# Patient Record
Sex: FEMALE | Race: OTHER
[De-identification: names, ages, dates, MRNs, and addresses within clinical notes are randomized per-mention and may not be internally consistent; named-entity substitution may affect disease eponyms.]

---

## 2019-03-31 ENCOUNTER — HOSPITAL ENCOUNTER (EMERGENCY)
Dept: HOSPITAL 15 - ER | Age: 34
Discharge: HOME | End: 2019-03-31
Payer: MEDICAID

## 2019-03-31 VITALS — SYSTOLIC BLOOD PRESSURE: 101 MMHG | DIASTOLIC BLOOD PRESSURE: 75 MMHG

## 2019-03-31 DIAGNOSIS — O23.42: ICD-10-CM

## 2019-03-31 DIAGNOSIS — Z3A.18: ICD-10-CM

## 2019-03-31 DIAGNOSIS — O26.812: Primary | ICD-10-CM

## 2019-03-31 DIAGNOSIS — O99.012: ICD-10-CM

## 2019-03-31 DIAGNOSIS — F12.10: ICD-10-CM

## 2019-03-31 DIAGNOSIS — I95.9: ICD-10-CM

## 2019-03-31 LAB
ALBUMIN SERPL-MCNC: 3.1 G/DL (ref 3.4–5)
ALCOHOL, URINE: < 3 MG/DL (ref 0–5)
ALP SERPL-CCNC: 38 U/L (ref 45–117)
ALT SERPL-CCNC: 14 U/L (ref 13–56)
AMPHETAMINES UR QL SCN: NEGATIVE
ANION GAP SERPL CALCULATED.3IONS-SCNC: 8 MMOL/L (ref 5–15)
APTT PPP: 25.4 SEC (ref 23.78–33.04)
BARBITURATES UR QL SCN: NEGATIVE
BENZODIAZ UR QL SCN: NEGATIVE
BILIRUB SERPL-MCNC: 0.2 MG/DL (ref 0.2–1)
BUN SERPL-MCNC: 5 MG/DL (ref 7–18)
BUN/CREAT SERPL: 6.8
BZE UR QL SCN: NEGATIVE
CALCIUM SERPL-MCNC: 8.7 MG/DL (ref 8.5–10.1)
CANNABINOIDS UR QL SCN: POSITIVE
CHLORIDE SERPL-SCNC: 107 MMOL/L (ref 98–107)
CO2 SERPL-SCNC: 21 MMOL/L (ref 21–32)
GLUCOSE SERPL-MCNC: 127 MG/DL (ref 74–106)
HCT VFR BLD AUTO: 35.5 % (ref 36–46)
HGB BLD-MCNC: 11.6 G/DL (ref 12.2–16.2)
INR PPP: 0.92 (ref 0.9–1.15)
MAGNESIUM SERPL-MCNC: 1.8 MG/DL (ref 1.6–2.6)
MCH RBC QN AUTO: 29.8 PG (ref 28–32)
MCV RBC AUTO: 90.8 FL (ref 80–100)
NRBC BLD QL AUTO: 0 %
OPIATES UR QL SCN: NEGATIVE
PCP UR QL SCN: NEGATIVE
POTASSIUM SERPL-SCNC: 3.6 MMOL/L (ref 3.5–5.1)
PROT SERPL-MCNC: 6.6 G/DL (ref 6.4–8.2)
PROTHROMBIN TIME: 9.9 SEC (ref 9.27–12.13)
SODIUM SERPL-SCNC: 136 MMOL/L (ref 136–145)

## 2019-03-31 PROCEDURE — 83735 ASSAY OF MAGNESIUM: CPT

## 2019-03-31 PROCEDURE — 96365 THER/PROPH/DIAG IV INF INIT: CPT

## 2019-03-31 PROCEDURE — 96361 HYDRATE IV INFUSION ADD-ON: CPT

## 2019-03-31 PROCEDURE — 80053 COMPREHEN METABOLIC PANEL: CPT

## 2019-03-31 PROCEDURE — 82962 GLUCOSE BLOOD TEST: CPT

## 2019-03-31 PROCEDURE — 81001 URINALYSIS AUTO W/SCOPE: CPT

## 2019-03-31 PROCEDURE — 36415 COLL VENOUS BLD VENIPUNCTURE: CPT

## 2019-03-31 PROCEDURE — 85730 THROMBOPLASTIN TIME PARTIAL: CPT

## 2019-03-31 PROCEDURE — 80307 DRUG TEST PRSMV CHEM ANLYZR: CPT

## 2019-03-31 PROCEDURE — 99284 EMERGENCY DEPT VISIT MOD MDM: CPT

## 2019-03-31 PROCEDURE — 76805 OB US >/= 14 WKS SNGL FETUS: CPT

## 2019-03-31 PROCEDURE — 94761 N-INVAS EAR/PLS OXIMETRY MLT: CPT

## 2019-03-31 PROCEDURE — 85610 PROTHROMBIN TIME: CPT

## 2019-03-31 PROCEDURE — 85025 COMPLETE CBC W/AUTO DIFF WBC: CPT

## 2019-08-16 ENCOUNTER — HOSPITAL ENCOUNTER (OUTPATIENT)
Dept: HOSPITAL 15 - LDRP | Age: 34
Setting detail: OBSERVATION
LOS: 1 days | Discharge: HOME | DRG: 566 | End: 2019-08-17
Attending: OBSTETRICS & GYNECOLOGY | Admitting: OBSTETRICS & GYNECOLOGY
Payer: MEDICAID

## 2019-08-16 VITALS — BODY MASS INDEX: 42.14 KG/M2 | WEIGHT: 229 LBS | HEIGHT: 62 IN

## 2019-08-16 DIAGNOSIS — Z3A.37: ICD-10-CM

## 2019-08-16 PROCEDURE — 59025 FETAL NON-STRESS TEST: CPT

## 2019-08-16 PROCEDURE — 96372 THER/PROPH/DIAG INJ SC/IM: CPT

## 2019-08-16 PROCEDURE — 81002 URINALYSIS NONAUTO W/O SCOPE: CPT

## 2019-08-16 RX ADMIN — TERBUTALINE SULFATE SCH MG: 1 INJECTION, SOLUTION SUBCUTANEOUS at 23:48

## 2019-08-17 RX ADMIN — TERBUTALINE SULFATE SCH MG: 1 INJECTION, SOLUTION SUBCUTANEOUS at 00:20

## 2020-01-03 ENCOUNTER — HOSPITAL ENCOUNTER (EMERGENCY)
Dept: HOSPITAL 15 - ER | Age: 35
Discharge: HOME | End: 2020-01-03
Payer: MEDICAID

## 2020-01-03 VITALS — BODY MASS INDEX: 40.48 KG/M2 | WEIGHT: 220 LBS | HEIGHT: 62 IN

## 2020-01-03 VITALS — DIASTOLIC BLOOD PRESSURE: 65 MMHG | SYSTOLIC BLOOD PRESSURE: 105 MMHG
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
## 2020-01-03 DIAGNOSIS — S39.012A: Primary | ICD-10-CM
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
## 2020-01-03 PROCEDURE — 99283 EMERGENCY DEPT VISIT LOW MDM: CPT

## 2020-01-03 PROCEDURE — 96372 THER/PROPH/DIAG INJ SC/IM: CPT

## 2020-05-12 ENCOUNTER — HOSPITAL ENCOUNTER (INPATIENT)
Dept: HOSPITAL 15 - ER | Age: 35
LOS: 5 days | Discharge: HOME | DRG: 720 | End: 2020-05-17
Attending: INTERNAL MEDICINE | Admitting: NURSE PRACTITIONER
Payer: MEDICAID

## 2020-05-12 VITALS — HEIGHT: 62 IN | WEIGHT: 218.26 LBS | BODY MASS INDEX: 40.16 KG/M2

## 2020-05-12 DIAGNOSIS — N20.0: ICD-10-CM

## 2020-05-12 DIAGNOSIS — Z83.3: ICD-10-CM

## 2020-05-12 DIAGNOSIS — R56.9: ICD-10-CM

## 2020-05-12 DIAGNOSIS — A41.9: Primary | ICD-10-CM

## 2020-05-12 DIAGNOSIS — E66.9: ICD-10-CM

## 2020-05-12 DIAGNOSIS — B96.20: ICD-10-CM

## 2020-05-12 DIAGNOSIS — N13.9: ICD-10-CM

## 2020-05-12 DIAGNOSIS — Z11.59: ICD-10-CM

## 2020-05-12 DIAGNOSIS — Z82.49: ICD-10-CM

## 2020-05-12 DIAGNOSIS — Z81.8: ICD-10-CM

## 2020-05-12 DIAGNOSIS — N39.0: ICD-10-CM

## 2020-05-12 LAB
ALBUMIN SERPL-MCNC: 3.9 G/DL (ref 3.4–5)
ALP SERPL-CCNC: 60 U/L (ref 45–117)
ALT SERPL-CCNC: 29 U/L (ref 13–56)
ANION GAP SERPL CALCULATED.3IONS-SCNC: 8 MMOL/L (ref 5–15)
BILIRUB SERPL-MCNC: 0.6 MG/DL (ref 0.2–1)
BUN SERPL-MCNC: 10 MG/DL (ref 7–18)
BUN/CREAT SERPL: 15.9
CALCIUM SERPL-MCNC: 9.2 MG/DL (ref 8.5–10.1)
CHLORIDE SERPL-SCNC: 102 MMOL/L (ref 98–107)
CO2 SERPL-SCNC: 23 MMOL/L (ref 21–32)
GLUCOSE SERPL-MCNC: 106 MG/DL (ref 74–106)
HCT VFR BLD AUTO: 42 % (ref 36–46)
HGB BLD-MCNC: 13.9 G/DL (ref 12.2–16.2)
LIPASE SERPL-CCNC: 83 U/L (ref 73–393)
MCH RBC QN AUTO: 29.3 PG (ref 28–32)
MCV RBC AUTO: 88.2 FL (ref 80–100)
NRBC BLD QL AUTO: 0 %
POTASSIUM SERPL-SCNC: 3.9 MMOL/L (ref 3.5–5.1)
PROT SERPL-MCNC: 8.2 G/DL (ref 6.4–8.2)
SODIUM SERPL-SCNC: 133 MMOL/L (ref 136–145)

## 2020-05-12 PROCEDURE — 81001 URINALYSIS AUTO W/SCOPE: CPT

## 2020-05-12 PROCEDURE — 86900 BLOOD TYPING SEROLOGIC ABO: CPT

## 2020-05-12 PROCEDURE — 87086 URINE CULTURE/COLONY COUNT: CPT

## 2020-05-12 PROCEDURE — 96365 THER/PROPH/DIAG IV INF INIT: CPT

## 2020-05-12 PROCEDURE — 87040 BLOOD CULTURE FOR BACTERIA: CPT

## 2020-05-12 PROCEDURE — 80053 COMPREHEN METABOLIC PANEL: CPT

## 2020-05-12 PROCEDURE — 86850 RBC ANTIBODY SCREEN: CPT

## 2020-05-12 PROCEDURE — 86901 BLOOD TYPING SEROLOGIC RH(D): CPT

## 2020-05-12 PROCEDURE — 85730 THROMBOPLASTIN TIME PARTIAL: CPT

## 2020-05-12 PROCEDURE — 87088 URINE BACTERIA CULTURE: CPT

## 2020-05-12 PROCEDURE — 83690 ASSAY OF LIPASE: CPT

## 2020-05-12 PROCEDURE — 36415 COLL VENOUS BLD VENIPUNCTURE: CPT

## 2020-05-12 PROCEDURE — 85025 COMPLETE CBC W/AUTO DIFF WBC: CPT

## 2020-05-12 PROCEDURE — 81025 URINE PREGNANCY TEST: CPT

## 2020-05-12 PROCEDURE — 80048 BASIC METABOLIC PNL TOTAL CA: CPT

## 2020-05-12 PROCEDURE — 87186 SC STD MICRODIL/AGAR DIL: CPT

## 2020-05-12 PROCEDURE — 85610 PROTHROMBIN TIME: CPT

## 2020-05-12 PROCEDURE — 74176 CT ABD & PELVIS W/O CONTRAST: CPT

## 2020-05-12 PROCEDURE — 74018 RADEX ABDOMEN 1 VIEW: CPT

## 2020-05-12 PROCEDURE — 83735 ASSAY OF MAGNESIUM: CPT

## 2020-05-12 PROCEDURE — 71045 X-RAY EXAM CHEST 1 VIEW: CPT

## 2020-05-12 PROCEDURE — 96375 TX/PRO/DX INJ NEW DRUG ADDON: CPT

## 2020-05-13 VITALS — DIASTOLIC BLOOD PRESSURE: 68 MMHG | SYSTOLIC BLOOD PRESSURE: 108 MMHG

## 2020-05-13 VITALS — DIASTOLIC BLOOD PRESSURE: 72 MMHG | SYSTOLIC BLOOD PRESSURE: 129 MMHG

## 2020-05-13 VITALS — SYSTOLIC BLOOD PRESSURE: 97 MMHG | DIASTOLIC BLOOD PRESSURE: 61 MMHG

## 2020-05-13 VITALS — DIASTOLIC BLOOD PRESSURE: 56 MMHG | SYSTOLIC BLOOD PRESSURE: 102 MMHG

## 2020-05-13 VITALS — SYSTOLIC BLOOD PRESSURE: 102 MMHG | DIASTOLIC BLOOD PRESSURE: 56 MMHG

## 2020-05-13 VITALS — DIASTOLIC BLOOD PRESSURE: 63 MMHG | SYSTOLIC BLOOD PRESSURE: 114 MMHG

## 2020-05-13 VITALS — DIASTOLIC BLOOD PRESSURE: 65 MMHG | SYSTOLIC BLOOD PRESSURE: 106 MMHG

## 2020-05-13 RX ADMIN — CEFTRIAXONE SODIUM SCH MLS/HR: 1 INJECTION, POWDER, FOR SOLUTION INTRAMUSCULAR; INTRAVENOUS at 09:30

## 2020-05-13 RX ADMIN — ACETAMINOPHEN PRN MG: 325 TABLET ORAL at 16:37

## 2020-05-13 RX ADMIN — SODIUM CHLORIDE SCH MLS/HR: 0.9 INJECTION, SOLUTION INTRAVENOUS at 14:05

## 2020-05-13 RX ADMIN — SODIUM CHLORIDE SCH MLS/HR: 0.9 INJECTION, SOLUTION INTRAVENOUS at 00:45

## 2020-05-13 RX ADMIN — HYDROCODONE BITARTRATE AND ACETAMINOPHEN PRN TAB: 5; 325 TABLET ORAL at 11:11

## 2020-05-13 RX ADMIN — FOLIC ACID SCH MG: 1 TABLET ORAL at 09:31

## 2020-05-13 RX ADMIN — FAMOTIDINE SCH MG: 20 TABLET, FILM COATED ORAL at 09:30

## 2020-05-13 RX ADMIN — ACETAMINOPHEN PRN MG: 325 TABLET ORAL at 04:51

## 2020-05-13 RX ADMIN — FAMOTIDINE SCH MG: 20 TABLET, FILM COATED ORAL at 21:57

## 2020-05-13 NOTE — NUR
PATIENT HAS A TEMPERATURE .0, PATIENT IS ALERT AND ORIENTEDX4, NO C/O PAIN,NO S/S OF 
DISTRESS,  MILD SHAKING NOTED. STARTED COOLING MEASURES AND ADMINISTERED TYLENOL PER MD 
ORDERS. WILL CONTINUE TO MONITOR PRN.  

-------------------------------------------------------------------------------

Addendum: 05/13/20 at 1737 by Emelina Olivier RN

-------------------------------------------------------------------------------

REASSESSED TEMPERTAURE

102.9, CONTINUING COOLING MEASURES. AND ORAL HYDRATION. 

-------------------------------------------------------------------------------

Addendum: 05/13/20 at 1848 by Emelina Olivier RN

-------------------------------------------------------------------------------

temperature is 100.3. No s/s of distress noted/stated.

## 2020-05-13 NOTE — NUR
closing shift note

Patient is comfortably resting in bed, Iv fluids running. Patient on room air, no c/o pain. 
No s/s of distress/sob noted/stated. Bed at lowest locked position and call light within 
reach. Care endorsed to NOC RN.

## 2020-05-13 NOTE — NUR
Opening Shift Note

Assumed care of patient, awake and alert.  No S/S of distress/SOB or pain. temperature 
recheck 100.0. cooling measures initiated. Patient refused to sign consents stating " i do 
not know the full details" patient stated she prefers to signs consents down in OR. 
Instructed on POC and to call for assist PRN, will continue to monitor for changes Q1hr and 
PRN.  call light within reach. bed in low position.

## 2020-05-13 NOTE — NUR
patient temperature 101.3 rechecked 101.3. cooling measures initiated and patient medicated 
per md orders. will continue to monitor

## 2020-05-13 NOTE — NUR
report given to dayshift rn patient denies sob distress or pain. informed rn of patient 
temperature recheck and cooling measures were initiated

## 2020-05-13 NOTE — NUR
MS admit from KRIS[facility]AYWARD admitted to tele/MS after NO SBAR received.  Patient oriented to KIERAN DEUTSCH RN primary RN, unit, room, bed, and unit policies regarding patient care and 
visiting hours. Patient weighed by bedscale and encouraged to call if they need something. 
All questions and concerns addressed, patient verbalized understanding.

## 2020-05-13 NOTE — NUR
patient educated she will need to be NPO at midnight for procedure.patient verbalized 
understanding

## 2020-05-14 VITALS — DIASTOLIC BLOOD PRESSURE: 63 MMHG | SYSTOLIC BLOOD PRESSURE: 103 MMHG

## 2020-05-14 VITALS — SYSTOLIC BLOOD PRESSURE: 103 MMHG | DIASTOLIC BLOOD PRESSURE: 63 MMHG

## 2020-05-14 VITALS — SYSTOLIC BLOOD PRESSURE: 112 MMHG | DIASTOLIC BLOOD PRESSURE: 65 MMHG

## 2020-05-14 VITALS — SYSTOLIC BLOOD PRESSURE: 113 MMHG | DIASTOLIC BLOOD PRESSURE: 84 MMHG

## 2020-05-14 VITALS — DIASTOLIC BLOOD PRESSURE: 55 MMHG | SYSTOLIC BLOOD PRESSURE: 103 MMHG

## 2020-05-14 LAB
ANION GAP SERPL CALCULATED.3IONS-SCNC: 6 MMOL/L (ref 5–15)
APTT PPP: 33.6 SEC (ref 23.64–32.05)
BUN SERPL-MCNC: 4 MG/DL (ref 7–18)
BUN/CREAT SERPL: 8
CALCIUM SERPL-MCNC: 8.6 MG/DL (ref 8.5–10.1)
CHLORIDE SERPL-SCNC: 104 MMOL/L (ref 98–107)
CO2 SERPL-SCNC: 24 MMOL/L (ref 21–32)
GLUCOSE SERPL-MCNC: 105 MG/DL (ref 74–106)
HCT VFR BLD AUTO: 36.3 % (ref 36–46)
HGB BLD-MCNC: 12.1 G/DL (ref 12.2–16.2)
INR PPP: 1.12 (ref 0.9–1.15)
MAGNESIUM SERPL-MCNC: 2.1 MG/DL (ref 1.6–2.6)
MCH RBC QN AUTO: 29.7 PG (ref 28–32)
MCV RBC AUTO: 89 FL (ref 80–100)
NRBC BLD QL AUTO: 0.1 %
POTASSIUM SERPL-SCNC: 3.6 MMOL/L (ref 3.5–5.1)
SODIUM SERPL-SCNC: 134 MMOL/L (ref 136–145)

## 2020-05-14 PROCEDURE — 0TF3XZZ FRAGMENTATION IN RIGHT KIDNEY PELVIS, EXTERNAL APPROACH: ICD-10-PCS | Performed by: UROLOGY

## 2020-05-14 RX ADMIN — PIPERACILLIN SODIUM AND TAZOBACTAM SODIUM SCH MLS/HR: .375; 3 INJECTION, POWDER, LYOPHILIZED, FOR SOLUTION INTRAVENOUS at 23:48

## 2020-05-14 RX ADMIN — PIPERACILLIN SODIUM AND TAZOBACTAM SODIUM SCH MLS/HR: .375; 3 INJECTION, POWDER, LYOPHILIZED, FOR SOLUTION INTRAVENOUS at 13:19

## 2020-05-14 RX ADMIN — FOLIC ACID SCH MG: 1 TABLET ORAL at 09:21

## 2020-05-14 RX ADMIN — ONDANSETRON HYDROCHLORIDE PRN MG: 2 INJECTION, SOLUTION INTRAMUSCULAR; INTRAVENOUS at 13:36

## 2020-05-14 RX ADMIN — SODIUM CHLORIDE SCH MLS/HR: 0.9 INJECTION, SOLUTION INTRAVENOUS at 16:45

## 2020-05-14 RX ADMIN — FAMOTIDINE SCH MG: 20 TABLET, FILM COATED ORAL at 22:03

## 2020-05-14 RX ADMIN — FAMOTIDINE SCH MG: 20 TABLET, FILM COATED ORAL at 09:21

## 2020-05-14 RX ADMIN — PIPERACILLIN SODIUM AND TAZOBACTAM SODIUM SCH MLS/HR: .375; 3 INJECTION, POWDER, LYOPHILIZED, FOR SOLUTION INTRAVENOUS at 17:44

## 2020-05-14 RX ADMIN — SODIUM CHLORIDE SCH MLS/HR: 0.9 INJECTION, SOLUTION INTRAVENOUS at 03:25

## 2020-05-14 RX ADMIN — ONDANSETRON HYDROCHLORIDE PRN MG: 2 INJECTION, SOLUTION INTRAMUSCULAR; INTRAVENOUS at 18:22

## 2020-05-14 RX ADMIN — CEFTRIAXONE SODIUM SCH MLS/HR: 1 INJECTION, POWDER, FOR SOLUTION INTRAMUSCULAR; INTRAVENOUS at 09:21

## 2020-05-14 RX ADMIN — ACETAMINOPHEN PRN MG: 325 TABLET ORAL at 20:55

## 2020-05-14 NOTE — NUR
Report given to oncoming nurse Renan, patient resting in bed comfortably, no s/s of distress 
noted, seizure precautions in place.

## 2020-05-14 NOTE — NUR
pt temp 102.1 oral. this nurse initiated cooling measures and administered po tylenol per MD 
and MAR order.

## 2020-05-14 NOTE — NUR
Patient off unit to pre-op



Respirations even and unlabored, no distress noted. Consents signed and in the chart. IV 
patent with no s/s of leaking or infiltration.

## 2020-05-14 NOTE — NUR
open note

assumed care of pt. upon entering room pt awake, alert and oriented x4. pt on room air no 
distress noted or expressed. pt denies any pain. pt bed rails padded for hx of seizures. pt 
updated on plan of care. pt has humphrey in place, secured, below the waist and draining clear 
yellow. pt bed locked, low and 2x rails up.  call light in reach. this nurse to round q1hr. 
pt encouraged to call as needed.

## 2020-05-14 NOTE — NUR
patient rounds

patient is in bed. denies sob distress or pain. patient aware of npo status verbalized 
understanding. cooling measures continued

## 2020-05-14 NOTE — NUR
Patient back from OR, /55mmhg, HR 70 BPM, RR 17. Patient states no current pain, no 
dizziness, or nausea at this time. Resting comfortable in bed.

## 2020-05-15 VITALS — SYSTOLIC BLOOD PRESSURE: 96 MMHG | DIASTOLIC BLOOD PRESSURE: 59 MMHG

## 2020-05-15 VITALS — DIASTOLIC BLOOD PRESSURE: 69 MMHG | SYSTOLIC BLOOD PRESSURE: 108 MMHG

## 2020-05-15 VITALS — DIASTOLIC BLOOD PRESSURE: 60 MMHG | SYSTOLIC BLOOD PRESSURE: 105 MMHG

## 2020-05-15 VITALS — DIASTOLIC BLOOD PRESSURE: 55 MMHG | SYSTOLIC BLOOD PRESSURE: 99 MMHG

## 2020-05-15 VITALS — SYSTOLIC BLOOD PRESSURE: 99 MMHG | DIASTOLIC BLOOD PRESSURE: 62 MMHG

## 2020-05-15 LAB
ANION GAP SERPL CALCULATED.3IONS-SCNC: 5 MMOL/L (ref 5–15)
BUN SERPL-MCNC: 5 MG/DL (ref 7–18)
BUN/CREAT SERPL: 10.2
CALCIUM SERPL-MCNC: 9 MG/DL (ref 8.5–10.1)
CHLORIDE SERPL-SCNC: 107 MMOL/L (ref 98–107)
CO2 SERPL-SCNC: 26 MMOL/L (ref 21–32)
GLUCOSE SERPL-MCNC: 111 MG/DL (ref 74–106)
HCT VFR BLD AUTO: 35.2 % (ref 36–46)
HGB BLD-MCNC: 11.9 G/DL (ref 12.2–16.2)
MCH RBC QN AUTO: 29.9 PG (ref 28–32)
MCV RBC AUTO: 88.6 FL (ref 80–100)
NRBC BLD QL AUTO: 0.1 %
POTASSIUM SERPL-SCNC: 3.6 MMOL/L (ref 3.5–5.1)
SODIUM SERPL-SCNC: 138 MMOL/L (ref 136–145)

## 2020-05-15 RX ADMIN — HYDROCODONE BITARTRATE AND ACETAMINOPHEN PRN TAB: 5; 325 TABLET ORAL at 04:03

## 2020-05-15 RX ADMIN — FAMOTIDINE SCH MG: 20 TABLET, FILM COATED ORAL at 09:22

## 2020-05-15 RX ADMIN — PIPERACILLIN SODIUM AND TAZOBACTAM SODIUM SCH MLS/HR: .375; 3 INJECTION, POWDER, LYOPHILIZED, FOR SOLUTION INTRAVENOUS at 11:35

## 2020-05-15 RX ADMIN — PIPERACILLIN SODIUM AND TAZOBACTAM SODIUM SCH MLS/HR: .375; 3 INJECTION, POWDER, LYOPHILIZED, FOR SOLUTION INTRAVENOUS at 06:08

## 2020-05-15 RX ADMIN — FOLIC ACID SCH MG: 1 TABLET ORAL at 09:22

## 2020-05-15 RX ADMIN — SODIUM CHLORIDE SCH MLS/HR: 0.9 INJECTION, SOLUTION INTRAVENOUS at 19:25

## 2020-05-15 RX ADMIN — FAMOTIDINE SCH MG: 20 TABLET, FILM COATED ORAL at 21:08

## 2020-05-15 RX ADMIN — PIPERACILLIN SODIUM AND TAZOBACTAM SODIUM SCH MLS/HR: .375; 3 INJECTION, POWDER, LYOPHILIZED, FOR SOLUTION INTRAVENOUS at 17:47

## 2020-05-15 RX ADMIN — SODIUM CHLORIDE SCH MLS/HR: 0.9 INJECTION, SOLUTION INTRAVENOUS at 06:08

## 2020-05-15 RX ADMIN — PIPERACILLIN SODIUM AND TAZOBACTAM SODIUM SCH MLS/HR: .375; 3 INJECTION, POWDER, LYOPHILIZED, FOR SOLUTION INTRAVENOUS at 23:26

## 2020-05-15 NOTE — NUR
Per patient she no longer wants the humphrey out, she states " I changed my mind, I still have 
pain in my right kidney so I think I can feel stones still passing, I want to keep it in 
just in case", UNIQUE Husain Notified.

## 2020-05-15 NOTE — NUR
IV insertion

IV access obtained, via clean sterile technique by inserting 22 gauge catheter at right hand 
after  attempt(s)by Ramos Yusuf. IV secured properly. No trauma to site. Patient tolerated 
procedure well.

## 2020-05-15 NOTE — NUR
Report given to Nadya RN, care endorsed. Patient resting comfortably in bed with fall 
precautions in place, no s/s of distress noted, call light within reach.

## 2020-05-15 NOTE — NUR
IV removal

IV DC'd in the left forearm with sterile technique, catheter fully intact. Pressure dressing 
applied to site. Patient tolerated procedure well.  .

NOTE: i.v.site is hurting.

## 2020-05-16 VITALS — DIASTOLIC BLOOD PRESSURE: 77 MMHG | SYSTOLIC BLOOD PRESSURE: 115 MMHG

## 2020-05-16 VITALS — DIASTOLIC BLOOD PRESSURE: 69 MMHG | SYSTOLIC BLOOD PRESSURE: 102 MMHG

## 2020-05-16 VITALS — SYSTOLIC BLOOD PRESSURE: 100 MMHG | DIASTOLIC BLOOD PRESSURE: 63 MMHG

## 2020-05-16 VITALS — DIASTOLIC BLOOD PRESSURE: 65 MMHG | SYSTOLIC BLOOD PRESSURE: 117 MMHG

## 2020-05-16 VITALS — SYSTOLIC BLOOD PRESSURE: 108 MMHG | DIASTOLIC BLOOD PRESSURE: 63 MMHG

## 2020-05-16 LAB
ANION GAP SERPL CALCULATED.3IONS-SCNC: 6 MMOL/L (ref 5–15)
BUN SERPL-MCNC: 5 MG/DL (ref 7–18)
BUN/CREAT SERPL: 9.3
CALCIUM SERPL-MCNC: 9.1 MG/DL (ref 8.5–10.1)
CHLORIDE SERPL-SCNC: 105 MMOL/L (ref 98–107)
CO2 SERPL-SCNC: 28 MMOL/L (ref 21–32)
GLUCOSE SERPL-MCNC: 102 MG/DL (ref 74–106)
HCT VFR BLD AUTO: 36.4 % (ref 36–46)
HGB BLD-MCNC: 12.2 G/DL (ref 12.2–16.2)
MAGNESIUM SERPL-MCNC: 2.3 MG/DL (ref 1.6–2.6)
MCH RBC QN AUTO: 29.6 PG (ref 28–32)
MCV RBC AUTO: 88.6 FL (ref 80–100)
NRBC BLD QL AUTO: 0.1 %
POTASSIUM SERPL-SCNC: 3.8 MMOL/L (ref 3.5–5.1)
SODIUM SERPL-SCNC: 139 MMOL/L (ref 136–145)

## 2020-05-16 RX ADMIN — ACETAMINOPHEN PRN MG: 325 TABLET ORAL at 10:18

## 2020-05-16 RX ADMIN — SODIUM CHLORIDE SCH MLS/HR: 0.9 INJECTION, SOLUTION INTRAVENOUS at 18:21

## 2020-05-16 RX ADMIN — PIPERACILLIN SODIUM AND TAZOBACTAM SODIUM SCH MLS/HR: .375; 3 INJECTION, POWDER, LYOPHILIZED, FOR SOLUTION INTRAVENOUS at 12:25

## 2020-05-16 RX ADMIN — PIPERACILLIN SODIUM AND TAZOBACTAM SODIUM SCH MLS/HR: .375; 3 INJECTION, POWDER, LYOPHILIZED, FOR SOLUTION INTRAVENOUS at 05:34

## 2020-05-16 RX ADMIN — FOLIC ACID SCH MG: 1 TABLET ORAL at 10:18

## 2020-05-16 RX ADMIN — PIPERACILLIN SODIUM AND TAZOBACTAM SODIUM SCH MLS/HR: .375; 3 INJECTION, POWDER, LYOPHILIZED, FOR SOLUTION INTRAVENOUS at 18:21

## 2020-05-16 RX ADMIN — FAMOTIDINE SCH MG: 20 TABLET, FILM COATED ORAL at 10:17

## 2020-05-16 NOTE — NUR
Christian catheter discontinued

Order to discontinue Christian catheter.  Christian discontinued  with clean technique following 
deflation of balloon. Patient tolerated well with no complaints of pain. Continue care.

## 2020-05-16 NOTE — NUR
WOUND CARE NOTE: IN TO SEE PATIENT AT THIS TIME PER WOUND CARE CONSULT REQUEST. PATIENT 
RECEIVED A SMALL SKIN TEAR WHEN REMOVING IV FROM LEFT FOREARM.  WOUND APPEARS AS A TAPE 
ABRASION.  WOUND PHOTO TAKEN UPON ASSESSMENT BY BEDSIDE NURSE FOR REFERENCE.



CLEANSED WITH NS, PATTED DRY WITH STERILE GAUZE. APPLIED THERAHONEY, OPTIFOAM GENTLE 
DRESSING TO AREA. PATIENT EDUCATED IN WOUND CARE/DRESSING CHANGES AT THIS TIME. PATIENT 
VERBALIZED UNDERSTANDING.



RECOMMEND: Q 3 DAY/PRN DRESSING CHANGE TO LEFT FOREARM SKIN TEAR, SKIN/WOUND CARE PLAN, 
AVOID TAPE TO SKIN. NO FURTHER WOUND CARE MONITORING NEEDED AT THIS TIME. 

-------------------------------------------------------------------------------

Addendum: 05/16/20 at 1320 by Pema Goodman RN

-------------------------------------------------------------------------------

Amended: Links added.

## 2020-05-16 NOTE — NUR
Pain

Patient is complaining of headache at this time. Will give one time dose of motrin per 
patient's request.

## 2020-05-16 NOTE — NUR
Call from Dr. Perez

Call from Dr. Perez at this time. Patient is to stay another night until clearance from 
Dr. Amaya is obtained. Patient aware of possible discharge or possible procedure early next 
week depending on recommendations from Dr. Amaya.

## 2020-05-16 NOTE — NUR
Report given to Ramos JOSEPH, patient is resting no distress, already walked to the toilet 
this morning.

## 2020-05-16 NOTE — NUR
Nutrition Assessment Notes



please see attached link fo complete assessment



Est energy needs ABW 79 [id number] kcal (17-20 kcal/kg ABW) Est protein needs 79-86 g 
(1.0-1.1g/kg ABW).  Will reassess prn. 


-------------------------------------------------------------------------------

Addendum: 20 at 1457 by Mavis Bajwa RD

-------------------------------------------------------------------------------

Amended: Links added.

## 2020-05-17 VITALS — SYSTOLIC BLOOD PRESSURE: 104 MMHG | DIASTOLIC BLOOD PRESSURE: 61 MMHG

## 2020-05-17 VITALS — SYSTOLIC BLOOD PRESSURE: 105 MMHG | DIASTOLIC BLOOD PRESSURE: 61 MMHG

## 2020-05-17 VITALS — DIASTOLIC BLOOD PRESSURE: 43 MMHG | SYSTOLIC BLOOD PRESSURE: 88 MMHG

## 2020-05-17 VITALS — DIASTOLIC BLOOD PRESSURE: 58 MMHG | SYSTOLIC BLOOD PRESSURE: 98 MMHG

## 2020-05-17 LAB
ANION GAP SERPL CALCULATED.3IONS-SCNC: 5 MMOL/L (ref 5–15)
BUN SERPL-MCNC: 6 MG/DL (ref 7–18)
BUN/CREAT SERPL: 12.8
CALCIUM SERPL-MCNC: 8.9 MG/DL (ref 8.5–10.1)
CHLORIDE SERPL-SCNC: 107 MMOL/L (ref 98–107)
CO2 SERPL-SCNC: 24 MMOL/L (ref 21–32)
GLUCOSE SERPL-MCNC: 91 MG/DL (ref 74–106)
HCT VFR BLD AUTO: 38.3 % (ref 36–46)
HGB BLD-MCNC: 12.4 G/DL (ref 12.2–16.2)
MCH RBC QN AUTO: 28.8 PG (ref 28–32)
MCV RBC AUTO: 88.6 FL (ref 80–100)
NRBC BLD QL AUTO: 0.1 %
POTASSIUM SERPL-SCNC: 3.8 MMOL/L (ref 3.5–5.1)
SODIUM SERPL-SCNC: 136 MMOL/L (ref 136–145)

## 2020-05-17 RX ADMIN — FOLIC ACID SCH MG: 1 TABLET ORAL at 11:02

## 2020-05-17 NOTE — NUR
IV removal

IV DC'd with clean sterile technique, catheter fully intact. Pressure dressing applied to 
site. Patient tolerated well.

NOTE: Patient requested iv removal due to tenderness being intolerable.

## 2020-05-17 NOTE — NUR
Spoke with Dr. Blanchard.

the patient is cleared to be discharged from urologic stand point as long as she doesnt have 
pain and is afebrile.

the patient is afebrile and doesnt have pain.

## 2020-05-17 NOTE — NUR
Discharge instructions given as ordered. Encourage to follow up with PMD and Dr. Amaya. 
Encouraged to make their own follow up appointment because the offices are closed today. All 
questions and concerns addressed. Patient verbalized understanding. Patient ambulated to 
vehicle with all personal belongings, accompanied by staff and family member. No distress 
noted at time of departure.

## 2020-07-29 ENCOUNTER — HOSPITAL ENCOUNTER (EMERGENCY)
Dept: HOSPITAL 15 - ER | Age: 35
Discharge: HOME | End: 2020-07-29
Payer: MEDICAID

## 2020-07-29 VITALS — HEIGHT: 62 IN | WEIGHT: 210 LBS | BODY MASS INDEX: 38.64 KG/M2

## 2020-07-29 VITALS — DIASTOLIC BLOOD PRESSURE: 54 MMHG | SYSTOLIC BLOOD PRESSURE: 100 MMHG

## 2020-07-29 DIAGNOSIS — O20.0: ICD-10-CM

## 2020-07-29 DIAGNOSIS — O98.511: Primary | ICD-10-CM

## 2020-07-29 DIAGNOSIS — U07.1: ICD-10-CM

## 2020-07-29 DIAGNOSIS — Z3A.01: ICD-10-CM

## 2020-07-29 LAB
ALBUMIN SERPL-MCNC: 4.3 G/DL (ref 3.4–5)
ALP SERPL-CCNC: 49 U/L (ref 45–117)
ALT SERPL-CCNC: 46 U/L (ref 13–56)
AMYLASE SERPL-CCNC: 45 U/L (ref 25–115)
ANION GAP SERPL CALCULATED.3IONS-SCNC: 10 MMOL/L (ref 5–15)
APTT PPP: 28.4 SEC (ref 23.64–32.05)
BILIRUB SERPL-MCNC: 0.3 MG/DL (ref 0.2–1)
BUN SERPL-MCNC: 10 MG/DL (ref 7–18)
BUN/CREAT SERPL: 15.6
CALCIUM SERPL-MCNC: 9.8 MG/DL (ref 8.5–10.1)
CHLORIDE SERPL-SCNC: 108 MMOL/L (ref 98–107)
CO2 SERPL-SCNC: 20 MMOL/L (ref 21–32)
GLUCOSE SERPL-MCNC: 99 MG/DL (ref 74–106)
HCT VFR BLD AUTO: 44.5 % (ref 36–46)
HGB BLD-MCNC: 15 G/DL (ref 12.2–16.2)
INR PPP: 0.98 (ref 0.9–1.15)
LIPASE SERPL-CCNC: 113 U/L (ref 73–393)
MAGNESIUM SERPL-MCNC: 2.2 MG/DL (ref 1.6–2.6)
MCH RBC QN AUTO: 29.9 PG (ref 28–32)
MCV RBC AUTO: 88.6 FL (ref 80–100)
NRBC BLD QL AUTO: 0.1 %
POTASSIUM SERPL-SCNC: 3.7 MMOL/L (ref 3.5–5.1)
PROT SERPL-MCNC: 8.3 G/DL (ref 6.4–8.2)
SODIUM SERPL-SCNC: 138 MMOL/L (ref 136–145)

## 2020-07-29 PROCEDURE — 85730 THROMBOPLASTIN TIME PARTIAL: CPT

## 2020-07-29 PROCEDURE — 81001 URINALYSIS AUTO W/SCOPE: CPT

## 2020-07-29 PROCEDURE — 83690 ASSAY OF LIPASE: CPT

## 2020-07-29 PROCEDURE — 83735 ASSAY OF MAGNESIUM: CPT

## 2020-07-29 PROCEDURE — 76817 TRANSVAGINAL US OBSTETRIC: CPT

## 2020-07-29 PROCEDURE — 36415 COLL VENOUS BLD VENIPUNCTURE: CPT

## 2020-07-29 PROCEDURE — 99284 EMERGENCY DEPT VISIT MOD MDM: CPT

## 2020-07-29 PROCEDURE — 76801 OB US < 14 WKS SINGLE FETUS: CPT

## 2020-07-29 PROCEDURE — 85610 PROTHROMBIN TIME: CPT

## 2020-07-29 PROCEDURE — 85025 COMPLETE CBC W/AUTO DIFF WBC: CPT

## 2020-07-29 PROCEDURE — 84702 CHORIONIC GONADOTROPIN TEST: CPT

## 2020-07-29 PROCEDURE — 80053 COMPREHEN METABOLIC PANEL: CPT

## 2020-07-29 PROCEDURE — 82150 ASSAY OF AMYLASE: CPT

## 2021-02-22 ENCOUNTER — HOSPITAL ENCOUNTER (OUTPATIENT)
Dept: HOSPITAL 15 - LAB | Age: 36
Discharge: HOME | End: 2021-02-22
Attending: SPECIALIST
Payer: MEDICAID

## 2021-02-22 DIAGNOSIS — Z3A.00: ICD-10-CM

## 2021-02-22 DIAGNOSIS — Z34.80: Primary | ICD-10-CM

## 2021-02-22 LAB
HCT VFR BLD AUTO: 35.7 % (ref 36–46)
HGB BLD-MCNC: 12.3 G/DL (ref 12.2–16.2)
MCH RBC QN AUTO: 30.5 PG (ref 28–32)
MCV RBC AUTO: 88 FL (ref 80–100)
NRBC BLD QL AUTO: 0 %

## 2021-02-22 PROCEDURE — 86592 SYPHILIS TEST NON-TREP QUAL: CPT

## 2021-02-22 PROCEDURE — 36415 COLL VENOUS BLD VENIPUNCTURE: CPT

## 2021-02-22 PROCEDURE — 85025 COMPLETE CBC W/AUTO DIFF WBC: CPT

## 2021-02-24 ENCOUNTER — HOSPITAL ENCOUNTER (OUTPATIENT)
Dept: HOSPITAL 15 - LDRP | Age: 36
Setting detail: OBSERVATION
Discharge: HOME | End: 2021-02-24
Attending: OBSTETRICS & GYNECOLOGY | Admitting: OBSTETRICS & GYNECOLOGY
Payer: MEDICAID

## 2021-02-24 DIAGNOSIS — Z3A.36: ICD-10-CM

## 2021-02-24 PROCEDURE — 96361 HYDRATE IV INFUSION ADD-ON: CPT

## 2021-02-24 PROCEDURE — 96372 THER/PROPH/DIAG INJ SC/IM: CPT

## 2021-02-24 PROCEDURE — 59025 FETAL NON-STRESS TEST: CPT

## 2021-02-24 PROCEDURE — 96360 HYDRATION IV INFUSION INIT: CPT

## 2021-02-24 PROCEDURE — 81002 URINALYSIS NONAUTO W/O SCOPE: CPT

## 2021-03-12 ENCOUNTER — HOSPITAL ENCOUNTER (OUTPATIENT)
Dept: HOSPITAL 15 - OB | Age: 36
Discharge: HOME | End: 2021-03-12
Attending: SPECIALIST
Payer: MEDICAID

## 2021-03-12 DIAGNOSIS — Z20.822: ICD-10-CM

## 2021-03-12 DIAGNOSIS — Z34.83: Primary | ICD-10-CM

## 2021-03-12 DIAGNOSIS — Z3A.39: ICD-10-CM

## 2021-03-14 ENCOUNTER — HOSPITAL ENCOUNTER (INPATIENT)
Dept: HOSPITAL 15 - LDRP | Age: 36
LOS: 3 days | Discharge: HOME | DRG: 540 | End: 2021-03-17
Attending: SPECIALIST | Admitting: SPECIALIST
Payer: MEDICAID

## 2021-03-14 VITALS — DIASTOLIC BLOOD PRESSURE: 59 MMHG | SYSTOLIC BLOOD PRESSURE: 109 MMHG

## 2021-03-14 VITALS — DIASTOLIC BLOOD PRESSURE: 60 MMHG | SYSTOLIC BLOOD PRESSURE: 116 MMHG

## 2021-03-14 VITALS — SYSTOLIC BLOOD PRESSURE: 110 MMHG | DIASTOLIC BLOOD PRESSURE: 68 MMHG

## 2021-03-14 VITALS — DIASTOLIC BLOOD PRESSURE: 52 MMHG | SYSTOLIC BLOOD PRESSURE: 100 MMHG

## 2021-03-14 VITALS — BODY MASS INDEX: 40.85 KG/M2 | WEIGHT: 222 LBS | HEIGHT: 62 IN

## 2021-03-14 VITALS — DIASTOLIC BLOOD PRESSURE: 71 MMHG | SYSTOLIC BLOOD PRESSURE: 100 MMHG

## 2021-03-14 VITALS — SYSTOLIC BLOOD PRESSURE: 105 MMHG | DIASTOLIC BLOOD PRESSURE: 62 MMHG

## 2021-03-14 VITALS — DIASTOLIC BLOOD PRESSURE: 52 MMHG | SYSTOLIC BLOOD PRESSURE: 96 MMHG

## 2021-03-14 VITALS — DIASTOLIC BLOOD PRESSURE: 68 MMHG | SYSTOLIC BLOOD PRESSURE: 114 MMHG

## 2021-03-14 VITALS — SYSTOLIC BLOOD PRESSURE: 100 MMHG | DIASTOLIC BLOOD PRESSURE: 56 MMHG

## 2021-03-14 VITALS — SYSTOLIC BLOOD PRESSURE: 101 MMHG | DIASTOLIC BLOOD PRESSURE: 62 MMHG

## 2021-03-14 VITALS — SYSTOLIC BLOOD PRESSURE: 111 MMHG | DIASTOLIC BLOOD PRESSURE: 58 MMHG

## 2021-03-14 VITALS — SYSTOLIC BLOOD PRESSURE: 109 MMHG | DIASTOLIC BLOOD PRESSURE: 59 MMHG

## 2021-03-14 VITALS — SYSTOLIC BLOOD PRESSURE: 91 MMHG | DIASTOLIC BLOOD PRESSURE: 42 MMHG

## 2021-03-14 VITALS — SYSTOLIC BLOOD PRESSURE: 104 MMHG | DIASTOLIC BLOOD PRESSURE: 62 MMHG

## 2021-03-14 VITALS — SYSTOLIC BLOOD PRESSURE: 105 MMHG | DIASTOLIC BLOOD PRESSURE: 63 MMHG

## 2021-03-14 DIAGNOSIS — O34.211: Primary | ICD-10-CM

## 2021-03-14 DIAGNOSIS — Z30.2: ICD-10-CM

## 2021-03-14 DIAGNOSIS — G40.909: ICD-10-CM

## 2021-03-14 DIAGNOSIS — Z3A.39: ICD-10-CM

## 2021-03-14 LAB
ALBUMIN SERPL-MCNC: 3 G/DL (ref 3.4–5)
ALCOHOL, URINE: < 3 MG/DL (ref 0–10)
ALP SERPL-CCNC: 78 U/L (ref 45–117)
ALT SERPL-CCNC: 15 U/L (ref 13–56)
AMPHETAMINES UR QL SCN: NEGATIVE
ANION GAP SERPL CALCULATED.3IONS-SCNC: 12 MMOL/L (ref 5–15)
APTT PPP: 31.3 SEC (ref 23–31.2)
BARBITURATES UR QL SCN: NEGATIVE
BENZODIAZ UR QL SCN: NEGATIVE
BILIRUB SERPL-MCNC: 0.2 MG/DL (ref 0.2–1)
BUN SERPL-MCNC: 8 MG/DL (ref 7–18)
BUN/CREAT SERPL: 17
BZE UR QL SCN: NEGATIVE
CALCIUM SERPL-MCNC: 8.7 MG/DL (ref 8.5–10.1)
CANNABINOIDS UR QL SCN: NEGATIVE
CHLORIDE SERPL-SCNC: 107 MMOL/L (ref 98–107)
CO2 SERPL-SCNC: 20 MMOL/L (ref 21–32)
GLUCOSE SERPL-MCNC: 80 MG/DL (ref 74–106)
HCT VFR BLD AUTO: 37.5 % (ref 36–46)
HGB BLD-MCNC: 12.9 G/DL (ref 12.2–16.2)
INR PPP: 0.92 (ref 0.9–1.15)
MCH RBC QN AUTO: 30.3 PG (ref 28–32)
MCV RBC AUTO: 87.8 FL (ref 80–100)
NRBC BLD QL AUTO: 0 %
OPIATES UR QL SCN: NEGATIVE
PCP UR QL SCN: NEGATIVE
POTASSIUM SERPL-SCNC: 3.8 MMOL/L (ref 3.5–5.1)
PROT SERPL-MCNC: 6.9 G/DL (ref 6.4–8.2)
SODIUM SERPL-SCNC: 139 MMOL/L (ref 136–145)

## 2021-03-14 PROCEDURE — 86900 BLOOD TYPING SEROLOGIC ABO: CPT

## 2021-03-14 PROCEDURE — 96365 THER/PROPH/DIAG IV INF INIT: CPT

## 2021-03-14 PROCEDURE — 80053 COMPREHEN METABOLIC PANEL: CPT

## 2021-03-14 PROCEDURE — 96360 HYDRATION IV INFUSION INIT: CPT

## 2021-03-14 PROCEDURE — 59025 FETAL NON-STRESS TEST: CPT

## 2021-03-14 PROCEDURE — 85730 THROMBOPLASTIN TIME PARTIAL: CPT

## 2021-03-14 PROCEDURE — 96361 HYDRATE IV INFUSION ADD-ON: CPT

## 2021-03-14 PROCEDURE — 80307 DRUG TEST PRSMV CHEM ANLYZR: CPT

## 2021-03-14 PROCEDURE — 86592 SYPHILIS TEST NON-TREP QUAL: CPT

## 2021-03-14 PROCEDURE — 96366 THER/PROPH/DIAG IV INF ADDON: CPT

## 2021-03-14 PROCEDURE — 94762 N-INVAS EAR/PLS OXIMTRY CONT: CPT

## 2021-03-14 PROCEDURE — 36415 COLL VENOUS BLD VENIPUNCTURE: CPT

## 2021-03-14 PROCEDURE — 85025 COMPLETE CBC W/AUTO DIFF WBC: CPT

## 2021-03-14 PROCEDURE — 0UL70CZ OCCLUSION OF BILATERAL FALLOPIAN TUBES WITH EXTRALUMINAL DEVICE, OPEN APPROACH: ICD-10-PCS | Performed by: SPECIALIST

## 2021-03-14 PROCEDURE — 81001 URINALYSIS AUTO W/SCOPE: CPT

## 2021-03-14 PROCEDURE — 86850 RBC ANTIBODY SCREEN: CPT

## 2021-03-14 PROCEDURE — 85610 PROTHROMBIN TIME: CPT

## 2021-03-14 PROCEDURE — 86901 BLOOD TYPING SEROLOGIC RH(D): CPT

## 2021-03-14 RX ADMIN — CEFAZOLIN SODIUM SCH MLS/HR: 1 INJECTION, SOLUTION INTRAVENOUS at 21:04

## 2021-03-15 VITALS — SYSTOLIC BLOOD PRESSURE: 114 MMHG | DIASTOLIC BLOOD PRESSURE: 63 MMHG

## 2021-03-15 VITALS — SYSTOLIC BLOOD PRESSURE: 114 MMHG | DIASTOLIC BLOOD PRESSURE: 74 MMHG

## 2021-03-15 VITALS — DIASTOLIC BLOOD PRESSURE: 55 MMHG | SYSTOLIC BLOOD PRESSURE: 98 MMHG

## 2021-03-15 VITALS — SYSTOLIC BLOOD PRESSURE: 92 MMHG | DIASTOLIC BLOOD PRESSURE: 63 MMHG

## 2021-03-15 VITALS — SYSTOLIC BLOOD PRESSURE: 96 MMHG | DIASTOLIC BLOOD PRESSURE: 63 MMHG

## 2021-03-15 VITALS — DIASTOLIC BLOOD PRESSURE: 61 MMHG | SYSTOLIC BLOOD PRESSURE: 112 MMHG

## 2021-03-15 VITALS — SYSTOLIC BLOOD PRESSURE: 106 MMHG | DIASTOLIC BLOOD PRESSURE: 58 MMHG

## 2021-03-15 VITALS — SYSTOLIC BLOOD PRESSURE: 110 MMHG | DIASTOLIC BLOOD PRESSURE: 69 MMHG

## 2021-03-15 VITALS — DIASTOLIC BLOOD PRESSURE: 62 MMHG | SYSTOLIC BLOOD PRESSURE: 101 MMHG

## 2021-03-15 VITALS — SYSTOLIC BLOOD PRESSURE: 110 MMHG | DIASTOLIC BLOOD PRESSURE: 68 MMHG

## 2021-03-15 VITALS — DIASTOLIC BLOOD PRESSURE: 59 MMHG | SYSTOLIC BLOOD PRESSURE: 99 MMHG

## 2021-03-15 LAB
HCT VFR BLD AUTO: 32.3 % (ref 36–46)
HGB BLD-MCNC: 11.1 G/DL (ref 12.2–16.2)
MCH RBC QN AUTO: 30.4 PG (ref 28–32)
MCV RBC AUTO: 88.3 FL (ref 80–100)
NRBC BLD QL AUTO: 0 %

## 2021-03-15 RX ADMIN — DOCUSATE SODIUM SCH MG: 100 CAPSULE, LIQUID FILLED ORAL at 09:55

## 2021-03-15 RX ADMIN — IBUPROFEN PRN MG: 800 TABLET ORAL at 22:02

## 2021-03-15 RX ADMIN — HYDROCODONE BITARTRATE AND ACETAMINOPHEN PRN TAB: 5; 325 TABLET ORAL at 09:56

## 2021-03-15 RX ADMIN — CEFAZOLIN SODIUM SCH MLS/HR: 1 INJECTION, SOLUTION INTRAVENOUS at 13:53

## 2021-03-15 RX ADMIN — IBUPROFEN PRN MG: 800 TABLET ORAL at 13:53

## 2021-03-15 RX ADMIN — SIMETHICONE CHEW TAB 80 MG PRN MG: 80 TABLET ORAL at 09:56

## 2021-03-15 RX ADMIN — CEFAZOLIN SODIUM SCH MLS/HR: 1 INJECTION, SOLUTION INTRAVENOUS at 04:35

## 2021-03-15 RX ADMIN — HYDROCODONE BITARTRATE AND ACETAMINOPHEN PRN TAB: 5; 325 TABLET ORAL at 20:04

## 2021-03-15 RX ADMIN — DOCUSATE SODIUM SCH MG: 100 CAPSULE, LIQUID FILLED ORAL at 21:59

## 2021-03-16 VITALS — DIASTOLIC BLOOD PRESSURE: 71 MMHG | SYSTOLIC BLOOD PRESSURE: 117 MMHG

## 2021-03-16 VITALS — DIASTOLIC BLOOD PRESSURE: 65 MMHG | SYSTOLIC BLOOD PRESSURE: 110 MMHG

## 2021-03-16 VITALS — SYSTOLIC BLOOD PRESSURE: 117 MMHG | DIASTOLIC BLOOD PRESSURE: 75 MMHG

## 2021-03-16 VITALS — SYSTOLIC BLOOD PRESSURE: 124 MMHG | DIASTOLIC BLOOD PRESSURE: 75 MMHG

## 2021-03-16 VITALS — SYSTOLIC BLOOD PRESSURE: 118 MMHG | DIASTOLIC BLOOD PRESSURE: 70 MMHG

## 2021-03-16 VITALS — SYSTOLIC BLOOD PRESSURE: 106 MMHG | DIASTOLIC BLOOD PRESSURE: 70 MMHG

## 2021-03-16 RX ADMIN — DOCUSATE SODIUM SCH MG: 100 CAPSULE, LIQUID FILLED ORAL at 21:51

## 2021-03-16 RX ADMIN — DOCUSATE SODIUM SCH MG: 100 CAPSULE, LIQUID FILLED ORAL at 10:10

## 2021-03-16 RX ADMIN — SIMETHICONE CHEW TAB 80 MG PRN MG: 80 TABLET ORAL at 10:10

## 2021-03-16 RX ADMIN — HYDROCODONE BITARTRATE AND ACETAMINOPHEN PRN TAB: 5; 325 TABLET ORAL at 05:09

## 2021-03-16 RX ADMIN — IBUPROFEN PRN MG: 800 TABLET ORAL at 18:41

## 2021-03-16 RX ADMIN — HYDROCODONE BITARTRATE AND ACETAMINOPHEN PRN TAB: 5; 325 TABLET ORAL at 21:50

## 2021-03-16 RX ADMIN — HYDROCODONE BITARTRATE AND ACETAMINOPHEN PRN TAB: 5; 325 TABLET ORAL at 10:11

## 2021-03-16 RX ADMIN — IBUPROFEN PRN MG: 800 TABLET ORAL at 13:38

## 2021-03-17 VITALS — SYSTOLIC BLOOD PRESSURE: 103 MMHG | DIASTOLIC BLOOD PRESSURE: 61 MMHG

## 2021-03-17 VITALS — SYSTOLIC BLOOD PRESSURE: 109 MMHG | DIASTOLIC BLOOD PRESSURE: 60 MMHG

## 2021-03-17 VITALS — SYSTOLIC BLOOD PRESSURE: 99 MMHG | DIASTOLIC BLOOD PRESSURE: 61 MMHG

## 2021-03-17 RX ADMIN — DOCUSATE SODIUM SCH MG: 100 CAPSULE, LIQUID FILLED ORAL at 10:13

## 2021-03-17 RX ADMIN — IBUPROFEN PRN MG: 800 TABLET ORAL at 03:49

## 2022-09-23 ENCOUNTER — HOSPITAL ENCOUNTER (EMERGENCY)
Dept: HOSPITAL 15 - ER | Age: 37
Discharge: HOME | End: 2022-09-23
Payer: MEDICAID

## 2022-09-23 VITALS — WEIGHT: 189.6 LBS | BODY MASS INDEX: 33.59 KG/M2 | HEIGHT: 63 IN

## 2022-09-23 VITALS — SYSTOLIC BLOOD PRESSURE: 122 MMHG | DIASTOLIC BLOOD PRESSURE: 71 MMHG

## 2022-09-23 DIAGNOSIS — Z76.0: ICD-10-CM

## 2022-09-23 DIAGNOSIS — Z79.899: ICD-10-CM

## 2022-09-23 DIAGNOSIS — G40.909: Primary | ICD-10-CM

## 2022-09-23 DIAGNOSIS — Z79.1: ICD-10-CM
